# Patient Record
Sex: FEMALE | Race: WHITE | URBAN - METROPOLITAN AREA
[De-identification: names, ages, dates, MRNs, and addresses within clinical notes are randomized per-mention and may not be internally consistent; named-entity substitution may affect disease eponyms.]

---

## 2024-05-30 ENCOUNTER — TELEPHONE (OUTPATIENT)
Age: 69
End: 2024-05-30

## 2024-05-30 NOTE — TELEPHONE ENCOUNTER
I called and l/m to call back. We would like to know where imaging was done at.  I will need to get imaging pushed over to PACs.

## 2024-06-03 ENCOUNTER — HOSPITAL ENCOUNTER (OUTPATIENT)
Age: 69
Discharge: HOME | End: 2024-06-03
Payer: MEDICARE

## 2024-06-03 ENCOUNTER — OFFICE VISIT (OUTPATIENT)
Age: 69
End: 2024-06-03

## 2024-06-03 VITALS — HEIGHT: 66 IN | BODY MASS INDEX: 23.46 KG/M2 | WEIGHT: 146 LBS

## 2024-06-03 DIAGNOSIS — M25.551 GREATER TROCHANTERIC PAIN SYNDROME OF RIGHT LOWER EXTREMITY: ICD-10-CM

## 2024-06-03 DIAGNOSIS — M25.551 GREATER TROCHANTERIC PAIN SYNDROME OF RIGHT LOWER EXTREMITY: Primary | ICD-10-CM

## 2024-06-03 PROCEDURE — 73502 X-RAY EXAM HIP UNI 2-3 VIEWS: CPT

## 2024-06-03 RX ORDER — LIDOCAINE HYDROCHLORIDE 10 MG/ML
3 INJECTION, SOLUTION INFILTRATION; PERINEURAL ONCE
Status: COMPLETED | OUTPATIENT
Start: 2024-06-03 | End: 2024-06-03

## 2024-06-03 RX ORDER — FAMOTIDINE 40 MG/1
TABLET, FILM COATED ORAL
COMMUNITY
Start: 2024-05-29

## 2024-06-03 RX ORDER — LEVOTHYROXINE SODIUM 100 MCG
TABLET ORAL
COMMUNITY
Start: 2024-03-16

## 2024-06-03 RX ORDER — BETAMETHASONE SODIUM PHOSPHATE AND BETAMETHASONE ACETATE 3; 3 MG/ML; MG/ML
6 INJECTION, SUSPENSION INTRA-ARTICULAR; INTRALESIONAL; INTRAMUSCULAR; SOFT TISSUE ONCE
Status: COMPLETED | OUTPATIENT
Start: 2024-06-03 | End: 2024-06-03

## 2024-06-03 RX ADMIN — BETAMETHASONE SODIUM PHOSPHATE AND BETAMETHASONE ACETATE 6 MG: 3; 3 INJECTION, SUSPENSION INTRA-ARTICULAR; INTRALESIONAL; INTRAMUSCULAR; SOFT TISSUE at 11:36

## 2024-06-03 RX ADMIN — LIDOCAINE HYDROCHLORIDE 3 ML: 10 INJECTION, SOLUTION INFILTRATION; PERINEURAL at 11:37

## 2024-06-03 NOTE — PROGRESS NOTES
CARPAL TUNNEL RELEASE Right     INGUINAL HERNIA REPAIR Right     FLIP AND BSO (CERVIX REMOVED)      TONSILLECTOMY AND ADENOIDECTOMY       allergies, social and family histories were reviewed and updated as appropriate.    Review of Systems:  Relevant review of systems reviewed and available in the patient's chart scanned in under the MEDIA tablet today.    Vital Signs:  Ht 1.676 m (5' 6\")   Wt 66.2 kg (146 lb)   BMI 23.57 kg/m²     General Exam:   Constitutional: Patient is adequately groomed with no evidence of malnutrition, obesity absent  Mental Status: The patient is oriented to time, place and person.  The patient's mood and affect are appropriate.  Lymphatic: The lymphatic examination bilaterally reveals all areas to be without enlargement or induration.  Vascular: Examination reveals no swelling or calf tenderness.  Peripheral pulses are palpable and 2+.  Neurological: The patient has good coordination.  There is no focal weakness or sensory deficit.    Right Hip Examination:    Inspection:  Normal muscle contours and no significant limb length discrepancy.  No gross atrophy in any particular myotome.     Palpation: moderate tenderness to the greater trochanter/trochanteric bursa, no tenderness to the sacroiliac joint, no tenderness to the knee joint.    Range of Motion: hip flexion 115,  Hip abduction 45,  Hip internal rotation at 90° of flexion 15, hip external rotation at 90° of flexion 45.  Knee range of motion shows functional range without pain.  Ankle dorsiflexion and plantarflexion show functional range of motion.    Strength: Hip flexion  5/5, hip abduction 4/ 5, hip adduction 5/5.  Knee flexion and extension 4+-5/5 without pain.  DF/ PF ankle 4+-5/5    Special Tests: Log roll negative, Stinchfield's negative, Straight leg raise negative for radicular signs.   FADIR Negative  CIPRIANO Positive    Sensation to light touch grossly present and capillary refill less than 2 seconds.    Skin: There are no

## 2024-06-05 ENCOUNTER — TELEPHONE (OUTPATIENT)
Age: 69
End: 2024-06-05

## 2024-06-05 NOTE — TELEPHONE ENCOUNTER
Called patient, Informed her that her MRI is approved and that she call call at anytime j schedule her MRI. I also gave her the MyChart number so she can call them to help set up her MyChart so she can set up a virtual visit after her MRI to see Dr. Rogel..

## 2024-06-26 DIAGNOSIS — M25.551 GREATER TROCHANTERIC PAIN SYNDROME OF RIGHT LOWER EXTREMITY: ICD-10-CM
